# Patient Record
Sex: FEMALE | Race: BLACK OR AFRICAN AMERICAN | NOT HISPANIC OR LATINO | URBAN - METROPOLITAN AREA
[De-identification: names, ages, dates, MRNs, and addresses within clinical notes are randomized per-mention and may not be internally consistent; named-entity substitution may affect disease eponyms.]

---

## 2024-07-23 ENCOUNTER — EMERGENCY (EMERGENCY)
Facility: HOSPITAL | Age: 19
LOS: 0 days | Discharge: HOME | End: 2024-07-23
Attending: EMERGENCY MEDICINE
Payer: MEDICAID

## 2024-07-23 VITALS
SYSTOLIC BLOOD PRESSURE: 116 MMHG | HEART RATE: 85 BPM | OXYGEN SATURATION: 97 % | WEIGHT: 134.92 LBS | HEIGHT: 70 IN | RESPIRATION RATE: 18 BRPM | DIASTOLIC BLOOD PRESSURE: 79 MMHG | TEMPERATURE: 98 F

## 2024-07-23 DIAGNOSIS — R51.9 HEADACHE, UNSPECIFIED: ICD-10-CM

## 2024-07-23 DIAGNOSIS — J02.9 ACUTE PHARYNGITIS, UNSPECIFIED: ICD-10-CM

## 2024-07-23 DIAGNOSIS — J35.8 OTHER CHRONIC DISEASES OF TONSILS AND ADENOIDS: ICD-10-CM

## 2024-07-23 PROCEDURE — 70450 CT HEAD/BRAIN W/O DYE: CPT | Mod: 26,MC

## 2024-07-23 PROCEDURE — 70450 CT HEAD/BRAIN W/O DYE: CPT | Mod: MC

## 2024-07-23 PROCEDURE — 99284 EMERGENCY DEPT VISIT MOD MDM: CPT | Mod: 25

## 2024-07-23 PROCEDURE — 99284 EMERGENCY DEPT VISIT MOD MDM: CPT

## 2024-07-23 NOTE — ED ADULT TRIAGE NOTE - CHIEF COMPLAINT QUOTE
PT reports sore throat and headache. Pt reports that she has tonsil stones. No fever cough chills or SOB

## 2024-07-23 NOTE — ED PROVIDER NOTE - OBJECTIVE STATEMENT
19-year-old female who denies significant past medical history presents to the ED complaining of tonsil stones for the last 3 years.  Patient immigrated to [place] from [place] 1 year ago and just recently was approved for insurance, which is prompting her visit today.  Patient states she intermittently has stones come out of her tonsils which have a strong smell and she is embarrassed to speak with people.  Patient has also had intermittent headaches over the last 3 years as well.  Patient takes Tylenol with mild relief of the headaches.  While in the ED, patient does not have any head pain, throat pain, difficulty swallowing, difficulty breathing, vomiting, or diarrhea.

## 2024-07-23 NOTE — ED PROVIDER NOTE - PATIENT PORTAL LINK FT
You can access the FollowMyHealth Patient Portal offered by [facility] by registering at the following website: [email or web address]. By joining [employer]’s FollowMyHealth portal, you will also be able to view your health information using other applications (apps) compatible with our system. You can access the FollowMyHealth Patient Portal offered by [facility] by registering at the following website: [email or web address]. By joining [employer]’s FollowMyHealth portal, you will also be able to view your health information using other applications (apps) compatible with our system.

## 2024-07-23 NOTE — ED ADULT NURSE NOTE - CHIEF COMPLAINT
Pt presents ambulatory, w/ \"burning w/ urinating, for past several weeks\". Pt denies other symptoms.   The patient is a 19y Female complaining of sore throat.

## 2024-07-23 NOTE — ED PROVIDER NOTE - LANGUAGE ASSISTANCE NEEDED
Yes-Patient/Caregiver accepts free interpretation services...
NSR, HR 60, RBBB, low voltage QRS, official reading pending, cardiac clearance pending

## 2024-07-23 NOTE — ED PROVIDER NOTE - NSFOLLOWUPINSTRUCTIONS_ED_ALL_ED_FT
Vous avez été évalué aux urgences aujourd'antonio pour otilia calculs d'amygdales.  Baltazar ne met pas la vie en danger ni ne constitue un danger.  Veuillez faire un suivi auprès du spécialiste otilia oreilles, du lor et de la gorge.  L'hôpital vous appellera dans les 2 jours pour vous aider à prendre rendez-vous.    Retournez immédiatement aux urgences si vous commencez à ressentir une fièvre très élevée, une douleur intense ou otilia difficultés à respirer/à avaler.    Our Emergency Department Referral Coordinators will be reaching out to you in the next 24-48 hours from 9:00am to 5:00pm to schedule a follow up appointment. Please expect a phone call from the hospital in that time frame. If you do not receive a call or if you have any questions or concerns, you can reach them at   [phone].

## 2024-07-23 NOTE — ED PROVIDER NOTE - PROGRESS NOTE DETAILS
AE: Patient is well-appearing.  Patient amenable with follow-up with ENT.  Will discharge with strict return precautions.

## 2024-07-23 NOTE — ED ADULT NURSE NOTE - NSFALLRISKINTERV_ED_ALL_ED

## 2024-07-23 NOTE — ED PROVIDER NOTE - CLINICAL SUMMARY MEDICAL DECISION MAKING FREE TEXT BOX
19-year-old female who denies significant past medical history here with 3 years of recurrent tonsil stones and halitosis. no current ENT sxs. also years of HA but no HA as of now. never had imaging. no neck pain. on exam, nontoxic, vs noted   Gen: Alert, NAD  Skin: Warm, dry, intact  Head: NCAT, PERRL  ENT: Mucous membranes moist; OP w/o exudates or erythema, swelling, masses or bulging; punctate L tonsillar stone w/o swelling.   Neck: Supple, no meningismus or bruits  CV: RRR, normal S1, S2, no m/r/g  Resp: Non labored respirations, Lungs CTA b/l, no wheezes, rales, rhonchi  Abdomen: Soft, nondistended, nontender  Extremities: Moving all extremities, no edema  Neuro: No focal neuro deficits, AAOx3, CNs II-XII intact, strength 5/5 in b/l UE/LE, sensation grossly intact, FTN intact, no dysmetria, normal gait, no ataxia, no drift  Psych: Cooperative     CTH reassuring.  pt does not have red flag of sudden onset and pain being worse at onset to suggest SAH. No fevers, meningeal signs, AMS, or toxic appearance to indicate meningitis; no head trauma to indicate subdural/epidural/traumatic SAH; also no focal neuro findings on examination or symptoms of increased ICP such as vomiting/nausea/blurry vision; ENT exam reassuring. In my opinion, based on current evaluation and results, an acute medical or surgical emergency does not appear to be occurring at this time and I feel that the pt is stable for further outpatient work up and/or treatment. Return precautions discussed. refer to neuro and ent.

## 2024-07-23 NOTE — ED PROVIDER NOTE - PHYSICAL EXAMINATION
PHYSICAL EXAM: I have reviewed current vital signs.  GENERAL: NAD, well-nourished; well-developed.  HEAD:  Normocephalic, atraumatic.  EYES: Conjunctiva and sclera clear.  ENT: No tonsil stones appreciated. No pharyngeal erythema or exudates.  NECK: No lymphadenopathy.  CHEST/LUNG: Clear to auscultation bilaterally; no wheezes, rales, or rhonchi.  HEART: Regular rate and rhythm, normal S1 and S2; no murmurs, rubs, or gallops.  ABDOMEN: Soft, nontender, nondistended.  EXTREMITIES:  2+ peripheral pulses; no clubbing, cyanosis, or edema.  PSYCH: Cooperative, appropriate, normal mood and affect.  NEUROLOGY: A&O x 3. No focal neurological deficits.   SKIN: Warm and dry.

## 2024-07-24 NOTE — CHART NOTE - NSCHARTNOTEFT_GEN_A_CORE
Specialty: ENT    [facility] MRN [id number] / Pt already f/u with ENT 7/24 - BART